# Patient Record
Sex: MALE | Race: WHITE | NOT HISPANIC OR LATINO | Employment: FULL TIME | URBAN - METROPOLITAN AREA
[De-identification: names, ages, dates, MRNs, and addresses within clinical notes are randomized per-mention and may not be internally consistent; named-entity substitution may affect disease eponyms.]

---

## 2018-02-08 ENCOUNTER — HOSPITAL ENCOUNTER (OUTPATIENT)
Dept: GENERAL RADIOLOGY | Facility: HOSPITAL | Age: 28
Discharge: HOME | End: 2018-02-08
Attending: INTERNAL MEDICINE | Admitting: INTERNAL MEDICINE

## 2018-02-08 ENCOUNTER — OFFICE VISIT (OUTPATIENT)
Dept: INTERNAL MEDICINE | Facility: CLINIC | Age: 28
End: 2018-02-08

## 2018-02-08 VITALS
SYSTOLIC BLOOD PRESSURE: 128 MMHG | BODY MASS INDEX: 27.7 KG/M2 | DIASTOLIC BLOOD PRESSURE: 62 MMHG | WEIGHT: 209 LBS | TEMPERATURE: 97.9 F | HEIGHT: 73 IN | HEART RATE: 64 BPM | OXYGEN SATURATION: 98 %

## 2018-02-08 DIAGNOSIS — M25.561 ACUTE PAIN OF RIGHT KNEE: ICD-10-CM

## 2018-02-08 DIAGNOSIS — Z83.3 FAMILY HISTORY OF DIABETES MELLITUS: ICD-10-CM

## 2018-02-08 DIAGNOSIS — Z76.89 ENCOUNTER TO ESTABLISH CARE: Primary | ICD-10-CM

## 2018-02-08 PROCEDURE — 99203 OFFICE O/P NEW LOW 30 MIN: CPT | Performed by: INTERNAL MEDICINE

## 2018-02-08 PROCEDURE — 73560 X-RAY EXAM OF KNEE 1 OR 2: CPT

## 2018-02-08 NOTE — PROGRESS NOTES
"Chief Complaint   Patient presents with   • Establish Care     with Dr. Vermeesch today. Pt states his right knee tends to catch, also states he has some pain in pointer finger on right hand.      Subjective   Casey Ruth is a 27 y.o. male.     HPI Comments: Pt is here to be established today.  No PMH.  PSH of meniscal tear.   NO meds, NKDA.  FH and SH reviewed.   He has a catch and pop in right knee.  It feels like it is knot aligned.  He pops his knees often.  It has been bothering him for over a week.  No recent injury.  No pain or aching.  The knee does feel weak at times.   He does do some weight lifting with squats, uses weights, about 100 lbs.  It may be a little worse lately with squats.  He does not have any stairs to note a difference.  He is seeing a chiropractor and this is helpful.   He has pain over right index MCP joint with certain movements.  This has been going on for many yrs.  No swelling or redness.  He does not take anything for this.    He feels he eats a healthy diet.  Does not go out to eat very often.  Exercises 2-3 times a week for an hour.   He sees the dentist twice a yr.          The following portions of the patient's history were reviewed and updated as appropriate: allergies, current medications, past family history, past medical history, past social history, past surgical history and problem list.    Review of Systems   HENT: Positive for postnasal drip.    Respiratory: Positive for cough.    Musculoskeletal:        Right knee pain  Occasional right index MCP pain   All other systems reviewed and are negative.      Objective   /62  Pulse 64  Temp 97.9 °F (36.6 °C)  Ht 185.4 cm (73\")  Wt 94.8 kg (209 lb)  SpO2 98%  BMI 27.57 kg/m2  Body mass index is 27.57 kg/(m^2).  Physical Exam   Constitutional: He is oriented to person, place, and time. He appears well-developed and well-nourished.   HENT:   Head: Normocephalic and atraumatic.   Right Ear: External ear normal. "   Left Ear: External ear normal.   Mouth/Throat: Oropharynx is clear and moist.   Eyes: Conjunctivae and EOM are normal. Pupils are equal, round, and reactive to light.   Neck: Normal range of motion. Neck supple. No thyromegaly present.   Cardiovascular: Normal rate, regular rhythm, normal heart sounds and intact distal pulses.    No murmur heard.  Pulmonary/Chest: Effort normal and breath sounds normal. No respiratory distress.   Abdominal: Soft. Bowel sounds are normal.   Musculoskeletal: He exhibits no edema.   Right second MCP joint with normal range of motion  Right knee: Full range of motion with mild pain upon full extension otherwise no significant pain, no pain with palpation of ligaments, no effusion or ballottement, negative Drawer and Lachman sign, mild pain with eversion over medial knee   Lymphadenopathy:     He has no cervical adenopathy.   Neurological: He is alert and oriented to person, place, and time. No cranial nerve deficit.   Psychiatric: He has a normal mood and affect. Judgment normal.   Nursing note and vitals reviewed.      Assessment/Plan   Casey Ruth is here today and the following problems have been addressed:      Casey was seen today for establish care.    Diagnoses and all orders for this visit:    Encounter to establish care  -     CBC & Differential  -     Comprehensive Metabolic Panel  -     Hemoglobin A1c  -     Lipid Panel    Family history of diabetes mellitus  -     Comprehensive Metabolic Panel  -     Hemoglobin A1c    Acute pain of right knee  -     XR Knee 1 or 2 View Right; Future    labs as noted  XR of right knee  Recommend he discontinue squats while lifting weight and limit activities that worsen knee pain at this time  He may continue to see chiropractor for ongoing care of his right knee  Will call patient with x-ray results and if there are any abnormal findings that require orthopedic care will make referral  Continue healthy diet and regular  exercise  Continue dayquil and nyquil for URI sxs    RTC one yr or prn    Please note that portions of this note were completed with a voice recognition program.  Efforts were made to edit dictation, but occasionally words are mistranscribed.

## 2018-02-09 ENCOUNTER — TELEPHONE (OUTPATIENT)
Dept: INTERNAL MEDICINE | Facility: CLINIC | Age: 28
End: 2018-02-09

## 2018-02-09 NOTE — TELEPHONE ENCOUNTER
----- Message from Marilyn K Vermeesch, MD sent at 2/8/2018  5:03 PM EST -----  Please tell patient there is no bone spur or abnormality of knee however he does have a small effusion or small amount of fluid noted suggesting there is a small amount of inflammation in his knee.  I do suggest that he avoid overuse of his knee with   squats for the next 2-3 weeks to see if this is helpful.  Continue chiropractic care, however if knee worsens I do recommend that he return to clinic and I will make referral to orthopedics for him.

## 2020-10-30 ENCOUNTER — CLINICAL SUPPORT (OUTPATIENT)
Dept: INTERNAL MEDICINE | Facility: CLINIC | Age: 30
End: 2020-10-30

## 2020-10-30 ENCOUNTER — TELEPHONE (OUTPATIENT)
Dept: INTERNAL MEDICINE | Facility: CLINIC | Age: 30
End: 2020-10-30

## 2020-10-30 ENCOUNTER — OFFICE VISIT (OUTPATIENT)
Dept: INTERNAL MEDICINE | Facility: CLINIC | Age: 30
End: 2020-10-30

## 2020-10-30 VITALS
WEIGHT: 191 LBS | BODY MASS INDEX: 25.31 KG/M2 | DIASTOLIC BLOOD PRESSURE: 68 MMHG | HEART RATE: 72 BPM | TEMPERATURE: 97.1 F | SYSTOLIC BLOOD PRESSURE: 120 MMHG | OXYGEN SATURATION: 99 % | HEIGHT: 73 IN

## 2020-10-30 DIAGNOSIS — Z00.00 ANNUAL PHYSICAL EXAM: Primary | ICD-10-CM

## 2020-10-30 DIAGNOSIS — Z11.1 TUBERCULOSIS SCREENING: ICD-10-CM

## 2020-10-30 DIAGNOSIS — Z11.1 SCREENING-PULMONARY TB: Primary | ICD-10-CM

## 2020-10-30 PROBLEM — M25.561 ACUTE PAIN OF RIGHT KNEE: Status: RESOLVED | Noted: 2018-02-08 | Resolved: 2020-10-30

## 2020-10-30 PROBLEM — Z76.89 ENCOUNTER TO ESTABLISH CARE: Status: RESOLVED | Noted: 2018-02-08 | Resolved: 2020-10-30

## 2020-10-30 LAB
INDURATION: 0 MM (ref 0–10)
Lab: NORMAL
Lab: NORMAL
TB SKIN TEST: NEGATIVE

## 2020-10-30 PROCEDURE — 86580 TB INTRADERMAL TEST: CPT | Performed by: INTERNAL MEDICINE

## 2020-10-30 PROCEDURE — 99395 PREV VISIT EST AGE 18-39: CPT | Performed by: INTERNAL MEDICINE

## 2020-10-30 PROCEDURE — 90686 IIV4 VACC NO PRSV 0.5 ML IM: CPT | Performed by: INTERNAL MEDICINE

## 2020-10-30 PROCEDURE — 90471 IMMUNIZATION ADMIN: CPT | Performed by: INTERNAL MEDICINE

## 2020-10-30 RX ORDER — DIPHENOXYLATE HYDROCHLORIDE AND ATROPINE SULFATE 2.5; .025 MG/1; MG/1
TABLET ORAL DAILY
COMMUNITY

## 2020-10-30 NOTE — TELEPHONE ENCOUNTER
----- Message from Marilyn K Vermeesch, MD sent at 10/30/2020  2:39 PM EDT -----  Regarding: TB skin test  I forgot to have you place a TB skin test on this patient.  If you want, please call him back and ask him to do this today or return on Monday.  Thank you

## 2020-10-30 NOTE — PROGRESS NOTES
"Chief Complaint   Patient presents with   • Annual Exam     Pt is needing  form filled out. Pt states he needs TB test as well.     Subjective   Casey Ruth is a 30 y.o. male.     Patient here today for annual physical exam.  No PMH.  PSH of meniscal tear.   NO meds except a multivitamin.  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    He does do some weight lifting with squats, uses 100 pound weights at times. This may bother his knee on occasion.      He eats at home and brings his lunch to work.  Does not go out to eat often.  Exercises 2-3 times a week for an hour, goes to the gym.   He sees the dentist twice a yr. He has not seen eye doctor lately, last time a few yrs ago, mild astigmatism in right eye.    He does want a flu shot today and needs TB skin test today as he wants to foster children.  He is currently going thru a divorce, he is tolerating this well.  His brother is a psychologist and he is also seeing a counselor.  He is not requiring any medication for his mood.  He is sleeping well.  No thoughts of SI.     No current worries or concerns.    His girlfriend has one child and one foster child.  He has 3 children that live with their mother and he sees them every AM before school    He does not smoke.  Rare alcohol use.  No drug use.        The following portions of the patient's history were reviewed and updated as appropriate: allergies, current medications, past family history, past medical history, past social history, past surgical history and problem list.    Review of Systems   Musculoskeletal:        Occasional knee pain with overuse   Psychiatric/Behavioral: Negative for dysphoric mood and sleep disturbance. The patient is nervous/anxious.    All other systems reviewed and are negative.      Objective   /68   Pulse 72   Temp 97.1 °F (36.2 °C)   Ht 185.4 cm (73\")   Wt 86.6 kg (191 lb)   SpO2 99%   BMI 25.20 kg/m²   Body mass index is 25.2 kg/m².  Physical " Exam  Vitals signs and nursing note reviewed.   Constitutional:       General: He is not in acute distress.     Appearance: Normal appearance. He is well-developed. He is not ill-appearing.   HENT:      Head: Normocephalic and atraumatic.      Right Ear: Tympanic membrane, ear canal and external ear normal.      Left Ear: Tympanic membrane, ear canal and external ear normal.   Eyes:      General:         Right eye: No discharge.         Left eye: No discharge.      Extraocular Movements: Extraocular movements intact.      Pupils: Pupils are equal, round, and reactive to light.   Neck:      Musculoskeletal: Normal range of motion and neck supple.      Thyroid: No thyromegaly.   Cardiovascular:      Rate and Rhythm: Normal rate and regular rhythm.      Pulses: Normal pulses.      Heart sounds: Normal heart sounds. No murmur.   Pulmonary:      Effort: Pulmonary effort is normal. No respiratory distress.      Breath sounds: Normal breath sounds.   Abdominal:      General: Abdomen is flat. There is no distension.      Palpations: Abdomen is soft.      Tenderness: There is no abdominal tenderness.   Musculoskeletal: Normal range of motion.      Right lower leg: No edema.      Left lower leg: No edema.   Lymphadenopathy:      Cervical: No cervical adenopathy.   Skin:     General: Skin is warm.      Findings: No rash.   Neurological:      General: No focal deficit present.      Mental Status: He is alert and oriented to person, place, and time. Mental status is at baseline.      Cranial Nerves: No cranial nerve deficit.      Motor: No weakness.      Gait: Gait normal.   Psychiatric:         Mood and Affect: Mood normal.         Behavior: Behavior normal.         Thought Content: Thought content normal.         Judgment: Judgment normal.         Assessment/Plan   Casey Ruth is here today and the following problems have been addressed:      Diagnoses and all orders for this visit:    1. Annual physical exam (Primary)  -      CBC & Differential  -     Comprehensive Metabolic Panel  -     Lipid Panel With / Chol / HDL Ratio    Other orders  -     Fluarix Quad >6 Months (3246-1238)     Labs as noted  Recommend avoidance of processed foods  Exercise for 30 minutes 5 days a week as tolerated  Recommend annual eye doctor appointment, or as necessary  See your dentist twice a year.  Recommend that you brush teeth twice daily and floss minimum of once daily  Recommend regular seatbelt use  Do not text or use phone while driving  Paperwork completed for foster parenting  Flu vaccine given today  TB skin test will be placed as this is required for foster parenting  Encouragement and reassurance given to patient today as he is currently going through divorce, he is in counseling and doing well    Return to clinic in 1 year or as needed        Please note that portions of this note were completed with a voice recognition program.  Efforts were made to edit dictation, but occasionally words are mistranscribed.

## 2020-10-31 LAB
ALBUMIN SERPL-MCNC: 4.5 G/DL (ref 3.5–5.2)
ALBUMIN/GLOB SERPL: 2.4 G/DL
ALP SERPL-CCNC: 83 U/L (ref 39–117)
ALT SERPL-CCNC: 16 U/L (ref 1–41)
AST SERPL-CCNC: 14 U/L (ref 1–40)
BASOPHILS # BLD AUTO: 0.04 10*3/MM3 (ref 0–0.2)
BASOPHILS NFR BLD AUTO: 0.4 % (ref 0–1.5)
BILIRUB SERPL-MCNC: 2 MG/DL (ref 0–1.2)
BUN SERPL-MCNC: 12 MG/DL (ref 6–20)
BUN/CREAT SERPL: 13.8 (ref 7–25)
CALCIUM SERPL-MCNC: 8.8 MG/DL (ref 8.6–10.5)
CHLORIDE SERPL-SCNC: 104 MMOL/L (ref 98–107)
CHOLEST SERPL-MCNC: 140 MG/DL (ref 0–200)
CHOLEST/HDLC SERPL: 3.5 {RATIO}
CO2 SERPL-SCNC: 26.8 MMOL/L (ref 22–29)
CREAT SERPL-MCNC: 0.87 MG/DL (ref 0.76–1.27)
EOSINOPHIL # BLD AUTO: 0.13 10*3/MM3 (ref 0–0.4)
EOSINOPHIL NFR BLD AUTO: 1.3 % (ref 0.3–6.2)
ERYTHROCYTE [DISTWIDTH] IN BLOOD BY AUTOMATED COUNT: 12.4 % (ref 12.3–15.4)
GLOBULIN SER CALC-MCNC: 1.9 GM/DL
GLUCOSE SERPL-MCNC: 81 MG/DL (ref 65–99)
HCT VFR BLD AUTO: 46.8 % (ref 37.5–51)
HDLC SERPL-MCNC: 40 MG/DL (ref 40–60)
HGB BLD-MCNC: 16.4 G/DL (ref 13–17.7)
IMM GRANULOCYTES # BLD AUTO: 0.02 10*3/MM3 (ref 0–0.05)
IMM GRANULOCYTES NFR BLD AUTO: 0.2 % (ref 0–0.5)
LDLC SERPL CALC-MCNC: 82 MG/DL (ref 0–100)
LYMPHOCYTES # BLD AUTO: 1.68 10*3/MM3 (ref 0.7–3.1)
LYMPHOCYTES NFR BLD AUTO: 16.9 % (ref 19.6–45.3)
MCH RBC QN AUTO: 29.9 PG (ref 26.6–33)
MCHC RBC AUTO-ENTMCNC: 35 G/DL (ref 31.5–35.7)
MCV RBC AUTO: 85.4 FL (ref 79–97)
MONOCYTES # BLD AUTO: 0.58 10*3/MM3 (ref 0.1–0.9)
MONOCYTES NFR BLD AUTO: 5.8 % (ref 5–12)
NEUTROPHILS # BLD AUTO: 7.52 10*3/MM3 (ref 1.7–7)
NEUTROPHILS NFR BLD AUTO: 75.4 % (ref 42.7–76)
NRBC BLD AUTO-RTO: 0 /100 WBC (ref 0–0.2)
PLATELET # BLD AUTO: 181 10*3/MM3 (ref 140–450)
POTASSIUM SERPL-SCNC: 4.1 MMOL/L (ref 3.5–5.2)
PROT SERPL-MCNC: 6.4 G/DL (ref 6–8.5)
RBC # BLD AUTO: 5.48 10*6/MM3 (ref 4.14–5.8)
SODIUM SERPL-SCNC: 142 MMOL/L (ref 136–145)
TRIGL SERPL-MCNC: 96 MG/DL (ref 0–150)
VLDLC SERPL CALC-MCNC: 18 MG/DL (ref 5–40)
WBC # BLD AUTO: 9.97 10*3/MM3 (ref 3.4–10.8)

## 2020-11-02 ENCOUNTER — CLINICAL SUPPORT (OUTPATIENT)
Dept: INTERNAL MEDICINE | Facility: CLINIC | Age: 30
End: 2020-11-02

## 2021-12-07 ENCOUNTER — OFFICE VISIT (OUTPATIENT)
Dept: INTERNAL MEDICINE | Facility: CLINIC | Age: 31
End: 2021-12-07

## 2021-12-07 VITALS
BODY MASS INDEX: 27.04 KG/M2 | HEIGHT: 73 IN | TEMPERATURE: 97.3 F | HEART RATE: 87 BPM | SYSTOLIC BLOOD PRESSURE: 126 MMHG | WEIGHT: 204 LBS | OXYGEN SATURATION: 98 % | DIASTOLIC BLOOD PRESSURE: 72 MMHG

## 2021-12-07 DIAGNOSIS — J30.2 SEASONAL ALLERGIES: ICD-10-CM

## 2021-12-07 DIAGNOSIS — Z00.00 ANNUAL PHYSICAL EXAM: ICD-10-CM

## 2021-12-07 DIAGNOSIS — Z11.1 VISIT FOR TB SKIN TEST: ICD-10-CM

## 2021-12-07 LAB
INDURATION: 0 MM (ref 0–10)
Lab: NORMAL
Lab: NORMAL
TB SKIN TEST: NEGATIVE

## 2021-12-07 PROCEDURE — 90471 IMMUNIZATION ADMIN: CPT | Performed by: INTERNAL MEDICINE

## 2021-12-07 PROCEDURE — 99395 PREV VISIT EST AGE 18-39: CPT | Performed by: INTERNAL MEDICINE

## 2021-12-07 PROCEDURE — 86580 TB INTRADERMAL TEST: CPT | Performed by: INTERNAL MEDICINE

## 2021-12-07 PROCEDURE — 90686 IIV4 VACC NO PRSV 0.5 ML IM: CPT | Performed by: INTERNAL MEDICINE

## 2021-12-07 NOTE — PROGRESS NOTES
"Chief Complaint   Patient presents with   • Annual Exam     needs  paperwork filled out. Needs TB skin test today.      Subjective   Casey Ruth is a 31 y.o. male.     Patient here today for annual physical exam.  No PMH.  PSH of meniscal tear. He has had some recent allergies, he does not take anything regularly for this.   NO meds except a multivitamin.  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  He has 3 children and his partner also has a child.  He is interested in fostering other children.  He needs paperwork completed today for foster care.  He does do some weight lifting with squats, uses 100 pound weights at times. This may bother his knee on occasion.      Exercises 2-3 times a week for an hour, goes to the gym.   He is eating a healthy diet overall.   He sees the dentist twice a yr. He has not seen eye doctor lately, last time a few yrs ago, mild astigmatism in right eye.  He drinks on occasion, not often.  Does not smoke.  Wears his seat belt.     He does want a flu shot today and needs TB skin test today for completion of foster care paperwork.       The following portions of the patient's history were reviewed and updated as appropriate: allergies, current medications, past family history, past medical history, past social history, past surgical history and problem list.    Review of Systems   Gastrointestinal:        Rare GERD symptoms   Musculoskeletal: Positive for arthralgias.        Occasional right knee pain   Allergic/Immunologic: Positive for environmental allergies.   All other systems reviewed and are negative.      Objective   /72   Pulse 87   Temp 97.3 °F (36.3 °C)   Ht 185.4 cm (73\")   Wt 92.5 kg (204 lb)   SpO2 98%   BMI 26.91 kg/m²   Body mass index is 26.91 kg/m².  Physical Exam  Vitals and nursing note reviewed.   Constitutional:       General: He is not in acute distress.     Appearance: Normal appearance. He is well-developed. He is not ill-appearing.      Comments: " Kind and pleasant young man, appears stated age and in NAD today   HENT:      Head: Normocephalic and atraumatic.      Right Ear: Tympanic membrane, ear canal and external ear normal.      Left Ear: Tympanic membrane, ear canal and external ear normal.   Eyes:      General:         Right eye: No discharge.         Left eye: No discharge.      Extraocular Movements: Extraocular movements intact.      Conjunctiva/sclera: Conjunctivae normal.      Pupils: Pupils are equal, round, and reactive to light.   Neck:      Thyroid: No thyromegaly.      Comments: No thyromegaly or mass  Cardiovascular:      Rate and Rhythm: Normal rate and regular rhythm.      Pulses: Normal pulses.      Heart sounds: Normal heart sounds. No murmur heard.      Pulmonary:      Effort: Pulmonary effort is normal. No respiratory distress.      Breath sounds: Normal breath sounds. No wheezing.   Abdominal:      General: Bowel sounds are normal. There is no distension.      Palpations: Abdomen is soft.      Tenderness: There is no abdominal tenderness.   Musculoskeletal:         General: Normal range of motion.      Cervical back: Normal range of motion and neck supple.      Right lower leg: No edema.      Left lower leg: No edema.   Lymphadenopathy:      Cervical: No cervical adenopathy.   Skin:     General: Skin is warm.      Findings: No rash.   Neurological:      General: No focal deficit present.      Mental Status: He is alert and oriented to person, place, and time. Mental status is at baseline.      Cranial Nerves: No cranial nerve deficit.      Motor: No weakness.      Coordination: Coordination normal.      Gait: Gait normal.   Psychiatric:         Mood and Affect: Mood normal.         Behavior: Behavior normal.         Thought Content: Thought content normal.         Judgment: Judgment normal.         Assessment/Plan   Casey Ruth is here today and the following problems have been addressed:      Diagnoses and all orders for this  visit:    1. Annual physical exam    2. Seasonal allergies    3. Visit for TB skin test  -     TB Skin Test; Future    Labs were normal last year, healthy young male and no need to repeat at this time  Exercise for 30 minutes 5 days a week  Recommend annual eye doctor appointment, or as necessary  See your dentist twice a year.  Recommend that you brush teeth twice daily and floss minimum of once daily  Recommend regular seatbelt use  Do not text or use phone while driving  Recommend Zyrtec or Xyzal as needed for seasonal allergies  Paperwork completed for foster care today  TB skin test applied, patient told to return for reading in 48 to 72 hours    Return to clinic in 1 year for annual exam or as needed      Please note that portions of this note were completed with a voice recognition program.  Efforts were made to edit dictation, but occasionally words are mistranscribed.

## 2021-12-10 ENCOUNTER — CLINICAL SUPPORT (OUTPATIENT)
Dept: INTERNAL MEDICINE | Facility: CLINIC | Age: 31
End: 2021-12-10

## 2022-01-25 PROCEDURE — U0004 COV-19 TEST NON-CDC HGH THRU: HCPCS | Performed by: NURSE PRACTITIONER

## 2022-11-08 ENCOUNTER — OFFICE VISIT (OUTPATIENT)
Dept: INTERNAL MEDICINE | Facility: CLINIC | Age: 32
End: 2022-11-08

## 2022-11-08 VITALS
HEIGHT: 73 IN | HEART RATE: 81 BPM | OXYGEN SATURATION: 98 % | BODY MASS INDEX: 26.77 KG/M2 | WEIGHT: 202 LBS | DIASTOLIC BLOOD PRESSURE: 80 MMHG | SYSTOLIC BLOOD PRESSURE: 120 MMHG | TEMPERATURE: 98.2 F

## 2022-11-08 DIAGNOSIS — Z11.59 NEED FOR HEPATITIS C SCREENING TEST: ICD-10-CM

## 2022-11-08 DIAGNOSIS — Z00.00 ANNUAL PHYSICAL EXAM: Primary | ICD-10-CM

## 2022-11-08 PROCEDURE — 90471 IMMUNIZATION ADMIN: CPT | Performed by: INTERNAL MEDICINE

## 2022-11-08 PROCEDURE — 99395 PREV VISIT EST AGE 18-39: CPT | Performed by: INTERNAL MEDICINE

## 2022-11-08 PROCEDURE — 90686 IIV4 VACC NO PRSV 0.5 ML IM: CPT | Performed by: INTERNAL MEDICINE

## 2022-11-08 NOTE — PROGRESS NOTES
"Chief Complaint   Patient presents with   • Annual Exam     Needing  paper work filled out.      Subjective   Casey Ruth is a 32 y.o. male.     History of Present Illness  Patient here today for annual physical exam.  No PMH.  PSH of meniscal tear.   No recent allergy issues.   NO meds except a multivitamin.  He tries to eat healthy at home.  He goes to the gym about 3-5 days a week, does both weights and cardio.   He is working at [employer] right now.   He is , has 3 foster kids.   He does not smoke.  He may drink once or twice a week, a shot or so.   Wears his seat belt, does not text and drive.   He sees dentist twice a yr.  No vision issues, has not seen eye doctor for a while.   No recent worries.    He has had 2 COVID vaccines.  Was given seasonal flu vaccine today.        The following portions of the patient's history were reviewed and updated as appropriate: allergies, current medications, past family history, past medical history, past social history, past surgical history and problem list.    Review of Systems   Constitutional: Negative for appetite change.   HENT: Negative.    Eyes: Negative.    Respiratory: Negative for cough, shortness of breath and wheezing.    Cardiovascular: Negative for chest pain and palpitations.   Gastrointestinal: Negative for abdominal pain.   Musculoskeletal: Positive for arthralgias.        Occasional knee pain since he had meniscal issue in distant past   Allergic/Immunologic: Positive for environmental allergies.   Neurological: Negative for headaches.   Psychiatric/Behavioral: Negative for dysphoric mood. The patient is not nervous/anxious.        Objective   /80   Pulse 81   Temp 98.2 °F (36.8 °C)   Ht 185.4 cm (73\")   Wt 91.6 kg (202 lb)   SpO2 98%   BMI 26.65 kg/m²   Body mass index is 26.65 kg/m².  Physical Exam  Vitals and nursing note reviewed.   Constitutional:       General: He is not in acute distress.     Appearance: Normal " appearance. He is well-developed. He is not ill-appearing.      Comments: Kind and pleasant man, appears stated age and in NAD today   HENT:      Head: Normocephalic and atraumatic.      Right Ear: Tympanic membrane, ear canal and external ear normal.      Left Ear: Tympanic membrane, ear canal and external ear normal.      Nose: No congestion.      Mouth/Throat:      Pharynx: No posterior oropharyngeal erythema.   Eyes:      General:         Right eye: No discharge.         Left eye: No discharge.      Extraocular Movements: Extraocular movements intact.      Conjunctiva/sclera: Conjunctivae normal.      Pupils: Pupils are equal, round, and reactive to light.   Neck:      Thyroid: No thyromegaly.      Vascular: No carotid bruit.      Comments: No thyromegaly or mass  Cardiovascular:      Rate and Rhythm: Normal rate and regular rhythm.      Pulses: Normal pulses.      Heart sounds: Normal heart sounds. No murmur heard.  Pulmonary:      Effort: Pulmonary effort is normal. No respiratory distress.      Breath sounds: Normal breath sounds. No wheezing.   Abdominal:      General: Bowel sounds are normal. There is no distension.      Palpations: Abdomen is soft.      Tenderness: There is no abdominal tenderness.   Musculoskeletal:         General: Normal range of motion.      Cervical back: Normal range of motion and neck supple.      Right lower leg: No edema.      Left lower leg: No edema.   Lymphadenopathy:      Cervical: No cervical adenopathy.   Skin:     General: Skin is warm.      Findings: No rash.   Neurological:      General: No focal deficit present.      Mental Status: He is alert and oriented to person, place, and time. Mental status is at baseline.      Cranial Nerves: No cranial nerve deficit.      Motor: No weakness.      Coordination: Coordination normal.      Gait: Gait normal.   Psychiatric:         Mood and Affect: Mood normal.         Behavior: Behavior normal.         Thought Content: Thought  content normal.         Judgment: Judgment normal.         Assessment & Plan   Casey Ruth is here today and the following problems have been addressed:      Diagnoses and all orders for this visit:    1. Annual physical exam (Primary)  -     CBC & Differential  -     Comprehensive Metabolic Panel  -     Hepatitis C Antibody  -     Lipid Panel With / Chol / HDL Ratio    2. Need for hepatitis C screening test  -     Hepatitis C Antibody    Other orders  -     FluLaval/Fluzone >6 mos (3450-7328)    Labs as noted  Recommend avoidance of processed foods  Exercise for 30 minutes 5 days a week as tolerated  Recommend annual eye doctor appointment, or as necessary  See your dentist twice a year.  Recommend that you brush teeth twice daily and floss minimum of once daily  Recommend regular seatbelt use  Do not text or use phone while driving  Paperwork for foster care completed today  Flu vaccine provided today  Consider COVID booster vaccine    Return to clinic in 1 year for annual exam or as needed    Please note that portions of this note were completed with a voice recognition program.  Efforts were made to edit dictation, but occasionally words are mistranscribed.

## 2023-01-10 ENCOUNTER — TELEPHONE (OUTPATIENT)
Dept: INTERNAL MEDICINE | Facility: CLINIC | Age: 33
End: 2023-01-10

## 2023-01-10 DIAGNOSIS — Z30.09 VASECTOMY EVALUATION: Primary | ICD-10-CM

## 2023-01-10 NOTE — TELEPHONE ENCOUNTER
Caller: Casey Ruth    Relationship: Self    Best call back number: [phone]    What is the medical concern/diagnosis: VASECOMTY    What specialty or service is being requested: UROLOGIST     Any additional details: [facility], CAN'T GET INTO [facility] UNTIL June 2023

## 2023-02-20 ENCOUNTER — OFFICE VISIT (OUTPATIENT)
Dept: UROLOGY | Facility: CLINIC | Age: 33
End: 2023-02-20
Payer: COMMERCIAL

## 2023-02-20 ENCOUNTER — PATIENT ROUNDING (BHMG ONLY) (OUTPATIENT)
Dept: UROLOGY | Facility: CLINIC | Age: 33
End: 2023-02-20
Payer: COMMERCIAL

## 2023-02-20 VITALS
SYSTOLIC BLOOD PRESSURE: 118 MMHG | DIASTOLIC BLOOD PRESSURE: 78 MMHG | HEART RATE: 66 BPM | TEMPERATURE: 98.2 F | WEIGHT: 202 LBS | BODY MASS INDEX: 26.77 KG/M2 | RESPIRATION RATE: 16 BRPM | OXYGEN SATURATION: 98 % | HEIGHT: 73 IN

## 2023-02-20 DIAGNOSIS — Z30.09 VASECTOMY EVALUATION: Primary | ICD-10-CM

## 2023-02-20 PROCEDURE — 99203 OFFICE O/P NEW LOW 30 MIN: CPT | Performed by: UROLOGY

## 2023-02-20 NOTE — PROGRESS NOTES
"Chief Complaint  Elective sterilization    Referring provider  Vermeesch, Marilyn K, MD    HPI  Mr. Ruth is a 32 y.o. male who presents with desire for irreversible, elective sterilization.    He has 3 kids.    His significant other is supportive of this decision.        Past Medical History  Past Medical History:   Diagnosis Date   • Depression        Past Surgical History  Past Surgical History:   Procedure Laterality Date   • KNEE MENISCAL REPAIR Left        Medications    Current Outpatient Medications:   •  multivitamin (THERAGRAN) tablet tablet, Take  by mouth Daily., Disp: , Rfl:     Allergies  No Known Allergies    Social History  Social History     Socioeconomic History   • Marital status:    Tobacco Use   • Smoking status: Never   • Smokeless tobacco: Never   Substance and Sexual Activity   • Alcohol use: Yes     Alcohol/week: 3.0 standard drinks     Types: 3 Drinks containing 0.5 oz of alcohol per week   • Drug use: No   • Sexual activity: Yes     Partners: Female       Family History  He has no family history of prostate cancer    Review of Systems  Constitutional: Negative for fever, chills, weight loss, weight gain and malaise/fatigue.   Skin: Negative for rash.   Eyes: Negative for blurred vision.   Endocrine: No heat/cold intolerance   Cardiovascular: Negative for chest pain or dyspnea on exertion.  Respiratory: Negative for shortness of breath or wheezing.   Gastrointestinal: Negative for nausea, abdominal pain, diarrhea, constipation.   Genitourinary: Negative for new lower urinary tract symptoms, current gross hematuria or dysuria.  Musculoskeletal: Negative for back pain and joint pain.   Lymph/Heme: Negative for easily bruises / bleeds.     Physical Exam  Visit Vitals  /78 (BP Location: Left arm, Patient Position: Sitting, Cuff Size: Adult)   Pulse 66   Temp 98.2 °F (36.8 °C) (Temporal)   Resp 16   Ht 185.4 cm (72.99\")   Wt 91.6 kg (202 lb)   SpO2 98%   BMI 26.66 kg/m² "     Constitutional: NAD, WDWN.   HEENT: NCAT. Conjunctivae normal.  MMM.    Cardiovascular: Regular rate.  Pulmonary/Chest: Respirations are even and non-labored bilaterally.  Abdominal: Soft. No distension, tenderness, masses or guarding. No CVA tenderness.  Neurological: A + O x 3.  Cranial Nerves II-XII grossly intact. Normal gait.  Extremities: RILEY x 4, Warm. No clubbing.  No cyanosis.    Skin: Pink, warm and dry.  No rashes noted.  Psychiatric:  Normal mood and affect  Genitourinary  Penis: Meatus and glans normal, no penile discharge.  No rashes/lesions.    Testes: descended bilaterally, no masses, nontender to palpation. Remainder of scrotal contents normal. No hernia appreciated.  Bilateral vasa palpable    Assessment and Plan  Mr. Ruth is a 32 y.o. male who presents today requesting permanent, irreversible surgical sterilization.  He was instructed to trim his pubic hair the night prior with a clippers.  The vasectomy was discussed in detail with the patient today including the risks which are failure of the procedure, infection, bleeding, development of chronic testicular pain and the possibility of injuring adjacent structures which could potentially result in loss of the testicle.  Furthermore, the patient was told he would remain fertile following the procedure until he provided a semen analysis that showed no sperm.  The patient expressed understanding and desire to proceed.      He will be scheduled for vasectomy with nitrous at his convenience.     I spent a total of 30 minutes in total patient care, which involved direct interaction, examination, chart review, and discussion of treatment options.

## 2023-02-25 NOTE — PROGRESS NOTES
February 24, 2023    Hello, may I speak with Casey Ruth? Unable to reach patient.    My name is Eleni.    I am  with [facility]  [address]  [address]  [facility] [address] [phone].    Before we get started may I verify your date of birth? 1990    I am calling to officially welcome you to our practice and ask about your recent visit. Is this a good time to talk?     Tell me about your visit with us. What things went well?         We're always looking for ways to make our patients' experiences even better. Do you have recommendations on ways we may improve?      Overall were you satisfied with your first visit to our practice?        I appreciate you taking the time to speak with me today. Is there anything else I can do for you?       Thank you, and have a great day.

## 2023-04-10 ENCOUNTER — PROCEDURE VISIT (OUTPATIENT)
Dept: UROLOGY | Facility: CLINIC | Age: 33
End: 2023-04-10
Payer: COMMERCIAL

## 2023-04-10 DIAGNOSIS — Z30.2 ENCOUNTER FOR VASECTOMY: Primary | ICD-10-CM

## 2023-04-10 PROCEDURE — 55250 REMOVAL OF SPERM DUCT(S): CPT | Performed by: UROLOGY

## 2023-04-10 NOTE — PATIENT INSTRUCTIONS
Home Care after Vasectomy   Follow these guidelines for your care after your surgery to help your recovery.    Activity   Limit your activity for the first 5 days after surgery to light activity.   You may return to work in a day or so.   Limit lifting, pushing or pulling to less than 20 pounds for the next week. Also limit running and long walks.     Swelling  Scrotal swelling from the surgery may take weeks to get better. You should call your doctor if the swelling is severe and the scrotum is larger than an orange.   Use ice packs to the scrotum and penis for 15 minutes every hour for the first 48 hours when you are awake to limit swelling. Use a plastic bag with ice or a bag of frozen peas for the ice pack. Wrap a cloth or towel around the ice pack so the ice does not directly touch your skin.   Wear a jock strap or tight underwear for the next week to support your scrotum and reduce swelling.    Incision care   Stitches will dissolve and do not need to be removed.    Expect a small amount of blood may stain the dressings for up to 72 hours after surgery.    For the first few days, apply two or three gauze pads to the site each day and as needed to keep the dressing dry. This will protect the incision and help keep your clothes clean.   When you are no longer having any drainage, stop using the gauze pads over the site.    Bathing or showering   You may shower 48 to 72 hours after surgery.  Allow the water to wash over the incision but do not scrub the incision. Dry the site gently by patting it with a clean towel.    Tub baths should be avoided for 7 days after surgery.    Swimming should be avoided for 14 days after surgery.      Pain control  You will likely be sent home with a prescription for a few days of pain medicine. .  After 48 hours, most patients can take extra strength Tylenol (acetaminophen) or Advil (ibuprofen) for pain, following the label directions. Pain most often is eased after 5 to 7  days.  Take the pain medication 2x per day for the first 3 days, scheduled.     Sexual activity  You need to avoid ejaculating for 3 days after surgery.    Follow up  You should have a semen analysis performed in 8-12 weeks after your procedure to confirm sterility.  Use contraceptives until this is confirmed to prevent pregnancy.    When to call your doctor   Severe swelling, larger than the size of an orange    A large amount of fluid drainage that soaks several pads per day   Pain that is not controlled with pain medicine and use of ice packs   Any signs of infection such as:        Increased redness or tenderness around the incision site        Pus type drainage from the incision        Fever of greater than 101 degrees F    Other Contacts  Urology Office:  [id number]   [place] [id number] [phone] office  [phone] fax

## 2023-04-10 NOTE — PROGRESS NOTES
Preoperative diagnosis  Elective sterilization    Postoperative diagnosis  Elective sterilization    Procedure performed  Bilateral vasectomy    Surgeon  Krzysztof Little MD    Anesthesia  5 mL lidocaine 2% local injection  50% nitrous oxide /oxygen mixture    Complications  None    EBL  2 mL    Specimen  Left vas  Right vas    Indications  32 y.o. male agreed to undergo the above named procedure after discussion of the alternatives, risks and benefits.  Informed consent was obtained.      Procedure  The patient was brought to the procedure room and placed in supine position.  His scrotum was prepped with Hibiclens and he was draped in a sterile fashion.  A timeout was performed.  The patient was given inhaled self-administered nitrous oxide for anesthesia.    Lidocaine 2% was used to anesthetize the scrotal skin as well as perivasal sheaths using a high-pressure jet injector. A 1 cm skin incision was created with the sharp-tip mosquito and a vas clamp utilized through this incision to grasp the vas.  The left vas was elevated to the skin surface and dissected free of its perivasal sheath.  The vas was transected and the lumen was cauterized both proximally and distally.  A 4-0 chromic suture was utilized to place the proximal vasal portion under the perivasal sheath, such that the 2 ends were divided by the perivasal sheath (fascial interposition).  This portion of the vas was then allowed to drop back into the left hemiscrotum.  The right side was treated next in the same manner as described above.  The skin incision was closed with the 4-0 chromic suture.  The patient tolerated the procedure well.    It was reiterated to the patient that he would remain fertile for sometime and should present to the office for a post-vacectomy semen analysis to confirm sterility.  We will check a semen analysis in approximately 2 months. The patient expressed understanding.

## 2023-04-12 LAB — REF LAB TEST METHOD: NORMAL

## 2023-04-13 ENCOUNTER — TELEPHONE (OUTPATIENT)
Dept: UROLOGY | Facility: CLINIC | Age: 33
End: 2023-04-13
Payer: COMMERCIAL

## 2023-04-13 NOTE — TELEPHONE ENCOUNTER
Called and left vm letting patient know I faxed forms to [employer] yesterday.     Ok for HUB to let patient know.   86.2

## 2023-04-13 NOTE — TELEPHONE ENCOUNTER
Caller: Casey Ruth    Relationship: Self    Best call back number: [phone]    What form or medical record are you requesting: SHORT TERM DISABILITY    Who is requesting this form or medical record from you: [employer]    How would you like to receive the form or medical records (pick-up, mail, fax):   FAX TO REQUESTING PROVIDER- FAX# LISTED ON FORMS    Timeframe paperwork needed: 1ST AVAILABLE    Additional notes: SHORT TERM DISABILITY FORMS WERE FAXED 4/12/23.  PLEASE CALL PT TO CONFIRM THIS WAS RECEIVED.

## 2023-04-25 ENCOUNTER — TELEPHONE (OUTPATIENT)
Dept: UROLOGY | Facility: CLINIC | Age: 33
End: 2023-04-25
Payer: COMMERCIAL

## 2023-04-25 NOTE — TELEPHONE ENCOUNTER
"Left message \" ok for hub to ask\" -need dates for [facility] [employer] paperwork..  N.BHAVYA Ybarra  "